# Patient Record
Sex: FEMALE | HISPANIC OR LATINO
[De-identification: names, ages, dates, MRNs, and addresses within clinical notes are randomized per-mention and may not be internally consistent; named-entity substitution may affect disease eponyms.]

---

## 2019-03-15 ENCOUNTER — RECORD ABSTRACTING (OUTPATIENT)
Age: 84
End: 2019-03-15

## 2019-03-15 DIAGNOSIS — Z86.59 PERSONAL HISTORY OF OTHER MENTAL AND BEHAVIORAL DISORDERS: ICD-10-CM

## 2019-03-15 DIAGNOSIS — Z86.39 PERSONAL HISTORY OF OTHER ENDOCRINE, NUTRITIONAL AND METABOLIC DISEASE: ICD-10-CM

## 2019-03-15 DIAGNOSIS — M47.814 SPONDYLOSIS W/OUT MYELOPATHY OR RADICULOPATHY, THORACIC REGION: ICD-10-CM

## 2019-03-15 DIAGNOSIS — M79.7 FIBROMYALGIA: ICD-10-CM

## 2019-03-15 DIAGNOSIS — Z78.9 OTHER SPECIFIED HEALTH STATUS: ICD-10-CM

## 2019-03-15 PROBLEM — Z00.00 ENCOUNTER FOR PREVENTIVE HEALTH EXAMINATION: Status: ACTIVE | Noted: 2019-03-15

## 2019-03-15 RX ORDER — SIMVASTATIN 40 MG/1
40 TABLET, FILM COATED ORAL
Refills: 0 | Status: ACTIVE | COMMUNITY

## 2019-03-18 ENCOUNTER — APPOINTMENT (OUTPATIENT)
Dept: PHYSICAL MEDICINE AND REHAB | Facility: CLINIC | Age: 84
End: 2019-03-18
Payer: MEDICARE

## 2019-03-18 VITALS — WEIGHT: 115 LBS | HEIGHT: 67 IN | BODY MASS INDEX: 18.05 KG/M2

## 2019-03-18 PROCEDURE — 64484 NJX AA&/STRD TFRM EPI L/S EA: CPT | Mod: RT

## 2019-03-18 PROCEDURE — 64483 NJX AA&/STRD TFRM EPI L/S 1: CPT | Mod: RT

## 2019-03-18 RX ORDER — LISINOPRIL AND HYDROCHLOROTHIAZIDE TABLETS 10; 12.5 MG/1; MG/1
10-12.5 TABLET ORAL
Refills: 0 | Status: DISCONTINUED | COMMUNITY
End: 2019-03-18

## 2019-03-18 RX ORDER — NAPROXEN SODIUM 550 MG/1
550 TABLET ORAL
Refills: 0 | Status: DISCONTINUED | COMMUNITY
End: 2019-03-18

## 2019-03-18 RX ORDER — ATORVASTATIN CALCIUM 20 MG/1
20 TABLET, FILM COATED ORAL
Refills: 0 | Status: DISCONTINUED | COMMUNITY
End: 2019-03-18

## 2019-03-18 RX ORDER — DONEPEZIL HYDROCHLORIDE 10 MG/1
10 TABLET ORAL
Refills: 0 | Status: DISCONTINUED | COMMUNITY
End: 2019-03-18

## 2019-03-18 RX ORDER — PRAVASTATIN SODIUM 40 MG/1
40 TABLET ORAL
Refills: 0 | Status: DISCONTINUED | COMMUNITY
End: 2019-03-18

## 2019-06-06 ENCOUNTER — APPOINTMENT (OUTPATIENT)
Dept: PHYSICAL MEDICINE AND REHAB | Facility: CLINIC | Age: 84
End: 2019-06-06
Payer: MEDICARE

## 2019-06-06 DIAGNOSIS — M51.26 OTHER INTERVERTEBRAL DISC DISPLACEMENT, LUMBAR REGION: ICD-10-CM

## 2019-06-06 DIAGNOSIS — M79.18 MYALGIA, OTHER SITE: ICD-10-CM

## 2019-06-06 DIAGNOSIS — M48.062 SPINAL STENOSIS, LUMBAR REGION WITH NEUROGENIC CLAUDICATION: ICD-10-CM

## 2019-06-06 PROCEDURE — 20552 NJX 1/MLT TRIGGER POINT 1/2: CPT

## 2019-06-06 PROCEDURE — 99213 OFFICE O/P EST LOW 20 MIN: CPT | Mod: 25

## 2019-06-06 NOTE — PHYSICAL EXAM
[FreeTextEntry1] : CHASITY is a 86 year female \par Constitutional: healthy appearing, NAD, and normal body habitus\par \par LUMBAR\par ROM: flexion to 30 deg, ext to 5 deg\par \par Gait: antalgic\par \par Inspection: no erythema, warmth\par Spine: no TTP in spinous process, SI joint, sacrum\par Bony palpation: no TTP in GT\par \par Soft tissue palpation hip: no TTP in gluteus yash, medius\par Soft tissue palpation of spine: no TTP in lumbar paraspinals\par \par 5/5 bilateral HF, KE, DF, PF \par sensation intact in bilat LE except decreased in right foot\par reflexes: knee and ankle 0 bilat\par \par Special tests: neg seated SLR\par \par

## 2019-06-06 NOTE — PROCEDURE
[de-identified] : After informed consent,she elected to proceed with a trigger point injection into the right lumbar paraspinals. I confirmed no prior adverse reactions, no active infections, and no relevant allergies. \par  \par The skin was prepped in the usual sterile manner.  The sites were injected with local anesthetic followed by local needling. The injection was completed without complication and a bandage was applied. She tolerated the procedure well and was given post-injection instructions. \par  \par Cold Tx x 48 hours, analgesics prn. Medications: 0.5 ml of 1% Lidocaine per site\par \par

## 2019-06-06 NOTE — HISTORY OF PRESENT ILLNESS
[FreeTextEntry1] : Location: right back\par Quality: sharp \par Severity: pain level 7/10 \par Duration: few months \par Timing: recurrent \par Context: atraumatic \par Aggravating Factors: sitting; bending \par Alleviating Factors: PT/OT; THIAGO \par Associated Symptoms: no weakness; no numbness/tingling; no weight loss; no fever; no chills; no change in bowel/bladder habits; radiation down leg (right) \par Prior Studies: x ray; MRI \par Working: Retired\par \par 4/2013 right L5 THIAGO\par 3/2019 right L4 and L5 THIAGO

## 2019-06-06 NOTE — ASSESSMENT
[FreeTextEntry1] : Will do right facet injections.  Needs to ice area and do HEP.  Daughter present in discussion.

## 2019-06-21 ENCOUNTER — APPOINTMENT (OUTPATIENT)
Dept: PHYSICAL MEDICINE AND REHAB | Facility: CLINIC | Age: 84
End: 2019-06-21
Payer: MEDICARE

## 2019-06-21 DIAGNOSIS — M47.816 SPONDYLOSIS W/OUT MYELOPATHY OR RADICULOPATHY, LUMBAR REGION: ICD-10-CM

## 2019-06-21 PROCEDURE — 64493 INJ PARAVERT F JNT L/S 1 LEV: CPT | Mod: RT

## 2019-06-21 PROCEDURE — 64494 INJ PARAVERT F JNT L/S 2 LEV: CPT | Mod: RT

## 2019-07-12 ENCOUNTER — APPOINTMENT (OUTPATIENT)
Dept: PHYSICAL MEDICINE AND REHAB | Facility: CLINIC | Age: 84
End: 2019-07-12

## 2020-12-04 ENCOUNTER — EMERGENCY (EMERGENCY)
Facility: HOSPITAL | Age: 85
LOS: 1 days | Discharge: HOME | End: 2020-12-04
Attending: EMERGENCY MEDICINE | Admitting: EMERGENCY MEDICINE
Payer: MEDICARE

## 2020-12-04 VITALS
HEART RATE: 56 BPM | SYSTOLIC BLOOD PRESSURE: 138 MMHG | TEMPERATURE: 98 F | OXYGEN SATURATION: 96 % | DIASTOLIC BLOOD PRESSURE: 72 MMHG | RESPIRATION RATE: 16 BRPM

## 2020-12-04 VITALS
HEIGHT: 65 IN | HEART RATE: 62 BPM | RESPIRATION RATE: 18 BRPM | DIASTOLIC BLOOD PRESSURE: 79 MMHG | OXYGEN SATURATION: 98 % | WEIGHT: 130.07 LBS | SYSTOLIC BLOOD PRESSURE: 127 MMHG | TEMPERATURE: 98 F

## 2020-12-04 PROCEDURE — 90715 TDAP VACCINE 7 YRS/> IM: CPT

## 2020-12-04 PROCEDURE — 99284 EMERGENCY DEPT VISIT MOD MDM: CPT | Mod: 25

## 2020-12-04 PROCEDURE — 70450 CT HEAD/BRAIN W/O DYE: CPT

## 2020-12-04 PROCEDURE — 90471 IMMUNIZATION ADMIN: CPT

## 2020-12-04 PROCEDURE — 99284 EMERGENCY DEPT VISIT MOD MDM: CPT

## 2020-12-04 PROCEDURE — 70450 CT HEAD/BRAIN W/O DYE: CPT | Mod: 26

## 2020-12-04 RX ORDER — ACETAMINOPHEN 500 MG
650 TABLET ORAL ONCE
Refills: 0 | Status: COMPLETED | OUTPATIENT
Start: 2020-12-04 | End: 2020-12-04

## 2020-12-04 RX ORDER — OFLOXACIN 0.3 %
1 DROPS OPHTHALMIC (EYE)
Qty: 20 | Refills: 0
Start: 2020-12-04 | End: 2020-12-08

## 2020-12-04 RX ORDER — BACITRACIN ZINC 500 UNIT/G
1 OINTMENT IN PACKET (EA) TOPICAL ONCE
Refills: 0 | Status: COMPLETED | OUTPATIENT
Start: 2020-12-04 | End: 2020-12-04

## 2020-12-04 RX ORDER — TETANUS TOXOID, REDUCED DIPHTHERIA TOXOID AND ACELLULAR PERTUSSIS VACCINE, ADSORBED 5; 2.5; 8; 8; 2.5 [IU]/.5ML; [IU]/.5ML; UG/.5ML; UG/.5ML; UG/.5ML
0.5 SUSPENSION INTRAMUSCULAR ONCE
Refills: 0 | Status: COMPLETED | OUTPATIENT
Start: 2020-12-04 | End: 2020-12-04

## 2020-12-04 RX ADMIN — TETANUS TOXOID, REDUCED DIPHTHERIA TOXOID AND ACELLULAR PERTUSSIS VACCINE, ADSORBED 0.5 MILLILITER(S): 5; 2.5; 8; 8; 2.5 SUSPENSION INTRAMUSCULAR at 17:01

## 2020-12-04 RX ADMIN — Medication 650 MILLIGRAM(S): at 17:31

## 2020-12-04 RX ADMIN — Medication 1 DROP(S): at 17:01

## 2020-12-04 RX ADMIN — Medication 1 APPLICATION(S): at 17:31

## 2020-12-04 RX ADMIN — Medication 650 MILLIGRAM(S): at 17:55

## 2020-12-04 NOTE — ED PROVIDER NOTE - OBJECTIVE STATEMENT
88F with a h/o dementia, lives with  at home, who p/w headache and facial abrasion after she accidentally fell out of bed 2 days ago. They went to urgent care and were told to come to the ER but patient refused until today. Pt also c/o left eye redness and irritation. Per daughter, the patient's  accidentally put toothpaste in her left eye "trying to help." Unknown last tetanus. No n/v, no cp/sob, no other complaints.

## 2020-12-04 NOTE — ED ADULT NURSE REASSESSMENT NOTE - NS ED NURSE REASSESS COMMENT FT1
Patient alert, disoriented to time, baseline dementia, denies any headache or any pain, no dizziness or lightheadedness.  Abrasion noted to left forehead area.  Vital signs stable.  CT scan done.  ADACEL IM administered.  Fall precaution observed; bed on low, siderails X 2, monitored in front of nurses station.  Results and disposition pending.  Stable and comfortable.

## 2020-12-04 NOTE — ED PROVIDER NOTE - CARE PLAN
Principal Discharge DX:	Abrasion  Secondary Diagnosis:	Headache   Principal Discharge DX:	Abrasion  Secondary Diagnosis:	Headache  Secondary Diagnosis:	Corneal abrasion

## 2020-12-04 NOTE — ED ADULT NURSE NOTE - OBJECTIVE STATEMENT
s/p fall 2 days ago in patient's bedroom, hit left side of face/head on nearby cabinet/table, no LOC, not on blood thinners.  Went to  2 days ago and told to go to ED.  Arrives w/ abrasion to left forehead area and redness and tearing to left eye.  Baseline dementia, no other neuro deficits.

## 2020-12-04 NOTE — ED ADULT TRIAGE NOTE - CHIEF COMPLAINT QUOTE
89 y/o female brought in by daughter for evaluation of headache s/p unwitnessed fall yesterday, Pt denies LOC, as per daughter patient with hx of dementia, not in any anticoagulants. abrasion noted to forehead.

## 2020-12-04 NOTE — ED ADULT TRIAGE NOTE - LANGUAGE ASSISTANCE NEEDED
No-Patient/Caregiver offered and refused free interpretation services./family member interpreted for pt

## 2020-12-04 NOTE — ED ADULT NURSE NOTE - LANGUAGE ASSISTANCE NEEDED
family member interpreted for pt/No-Patient/Caregiver offered and refused free interpretation services.

## 2020-12-04 NOTE — ED PROVIDER NOTE - CLINICAL SUMMARY MEDICAL DECISION MAKING FREE TEXT BOX
88F with a h/o dementia, lives with  at home, who p/w headache and facial abrasion after she accidentally fell out of bed 2 days ago. They went to urgent care and were told to come to the ER but patient refused until today. Pt also c/o left eye redness and irritation. 88F with a h/o dementia, lives with  at home, who p/w headache and facial abrasion after she accidentally fell out of bed 2 days ago. They went to urgent care and were told to come to the ER but patient refused until today. Pt also c/o left eye redness and irritation.  CT neg for ICH.   L eye stained with tetracaine and fluorescin with + small corneal abrasion at 5 o'clock, no globe injury.  Stable for DC with rx for ofloxacin (pt does not wear contacts), bacitracin for abrasions, and otc tylenol, motrin.

## 2020-12-04 NOTE — ED ADULT NURSE NOTE - CHPI ED NUR SYMPTOMS NEG
no loss of consciousness/no numbness/no vomiting/no weakness/no tingling/no bleeding/no fever/no deformity

## 2020-12-04 NOTE — ED ADULT NURSE REASSESSMENT NOTE - NS ED NURSE REASSESS COMMENT FT1
Patient baseline mental status, no new symptom complaint, Bacitracin applied to abrasion.  VItal signs stable.  CT scan resulted.  Discharged to home in stable condition.

## 2020-12-04 NOTE — ED PROVIDER NOTE - PATIENT PORTAL LINK FT
You can access the FollowMyHealth Patient Portal offered by [facility] by registering at the following website: [email or web address]. By joining [facility]’s FollowMyHealth portal, you will also be able to view your health information using other applications (apps) compatible with our system.

## 2020-12-04 NOTE — ED ADULT NURSE NOTE - NSIMPLEMENTINTERV_GEN_ALL_ED
Implemented All Fall Risk Interventions:  [place] to call system. Call bell, personal items and telephone within reach. Instruct patient to call for assistance. Room bathroom lighting operational. Non-slip footwear when patient is off stretcher. Physically safe environment: no spills, clutter or unnecessary equipment. Stretcher in lowest position, wheels locked, appropriate side rails in place. Provide visual cue, wrist band, yellow gown, etc. Monitor gait and stability. Monitor for mental status changes and reorient to person, place, and time. Review medications for side effects contributing to fall risk. Reinforce activity limits and safety measures with patient and family.

## 2020-12-04 NOTE — ED PROVIDER NOTE - PHYSICAL EXAMINATION
GEN: Well appearing, well nourished, pleasantly demented and in no apparent distress. NTAF  ENT: Airway patent, Nasal mucosa clear. Mouth with normal mucosa.  EYES: RIght eye clear, left eye with conjunctival injection, +small corneal abrasion at 5 o'clock, no globe injury, neg seidels sign, PERRL, EOMI  RESPIRATORY: Breathing comfortably with normal RR. No W/C/R, no hypoxia or resp distress.  CARDIAC: Regular rate and rhythm, no M/R/G  ABDOMEN: Soft, nontender, +bowel sounds, no rebound, rigidity, or guarding.  MSK: Range of motion is not limited, no deformities noted.  NEURO: Alert and oriented, no focal deficits.  SKIN: Skin normal color for race, warm, dry. Healing abrasions to middle of forehead, no cellulitis, no bleeding or drainage  PSYCH: Alert, demented, no apparent risk to self or others.

## 2020-12-04 NOTE — ED PROVIDER NOTE - NSFOLLOWUPINSTRUCTIONS_ED_ALL_ED_FT
Please follow up with your primary care physician and eye doctor. Use eye drops as directed. Take tylenol or motrin OTC for pain. Do not scratch eye.  If you have any problem getting an appointment this week, please call the ED Referral Coordinator at [phone].  Return to the Emergency Department if you have any new or worsening symptoms, or for any other concerns. Please read below for further information.      Corneal Abrasion     WHAT YOU NEED TO KNOW:    A corneal abrasion is a scratch on the cornea of your eye. The cornea is the clear layer that covers the front of your eye. A small scratch may heal in 1 to 2 days. Deeper or larger scratches may take longer to heal.     Eye Anatomy         DISCHARGE INSTRUCTIONS:    Call your healthcare provider or ophthalmologist if:   •Your eye pain or vision gets worse.      •You have yellow or green drainage from your eye.      •You have questions or concerns about your condition or care.      Medicines:   •Medicines may be given in the form of eyedrops or ointment to help prevent an eye infection. You may also be given eyedrops to decrease pain.      •Take your medicine as directed. Contact your healthcare provider if you think your medicine is not helping or if you have side effects. Tell him or her if you are allergic to any medicine. Keep a list of the medicines, vitamins, and herbs you take. Include the amounts, and when and why you take them. Bring the list or the pill bottles to follow-up visits. Carry your medicine list with you in case of an emergency.      Care for your eyes:   •Get regular eye exams. Get your eyes checked at least every year.      •Eat healthy foods. Fresh fruits and vegetables that are rich in vitamins A and C may help with your vision. Foods such as sweet potatoes, apricots, and carrots are rich in good nutrients for the eyes.      •Take care of your contacts or glasses. Store, clean, and use your contacts or glasses as directed. Replace your glasses or contact lenses as often as your healthcare provider suggests.      •Decrease eye strain. Rest your eyes, especially after you read or sew for long periods of time. Get plenty of sleep at night. Use lights that reduce glare in your home, school, or workplace.      •Wear dark sunglasses. This will help prevent pain and light sensitivity. Make sure the sunglasses have UVA and UVB protection. This will protect your eyes when you go outside.      •Use eyedrops safely. If your treatment plan includes eyedrops, it is important to use them as directed. Your provider may give you detailed instructions to follow. The eyedrops may also come with safety instructions. Follow all instructions to help prevent an infection. Do not touch the tip of the bottle to your eye. Germs from your eye can spread to the medicine bottle.  Steps 1 2 3 4           Help prevent corneal abrasions:   •Remove your contact lenses if your eyes feel dry or irritated.      •Wash your hands if you need to touch your eyes or your face.      •Trim your child's fingernails so he or she cannot scratch his or her eye.      •Wear protective eyewear when you work with chemicals, wood, dust, or metal.      •Wear protective eyewear when you play sports.      •Do not wear your contacts for longer than you should.      •Do not wear colored lenses or lenses with shapes on them. These lenses may cause eye damage and vision loss.      •Do not wear glitter makeup. Glitter can easily get into your eyes and under contact lenses.      •Do not sleep with your contacts in.      Follow up with your healthcare provider as directed: Write down your questions so you remember to ask them during your visits.       Abrasion    WHAT YOU NEED TO KNOW:    An abrasion is a scrape on your skin. It happens when your skin rubs against a rough surface. Some examples of an abrasion include rug burn, a skinned elbow, or road rash. Abrasions can be many shapes and sizes. The wound may hurt, bleed, bruise, or swell.     DISCHARGE INSTRUCTIONS:    Return to the emergency department if:   •The bleeding does not stop after 10 minutes of firm pressure.      •You cannot rinse one or more foreign objects out of your wound.      •You have red streaks on your skin coming from your wound.      Contact your healthcare provider if:   •You have a fever or chills.       •Your abrasion is red, warm, swollen, or draining pus.      •You have questions or concerns about your condition or care.      Care for your abrasion:   •Wash your hands and dry them with a clean towel.      •Press a clean cloth against your wound to stop any bleeding.      •Rinse your wound with a lot of clean water. Do not use harsh soap, alcohol, or iodine solutions.      •Use a clean, wet cloth to remove any objects, such as small pieces of rocks or dirt.      •Rub antibiotic ointment on your wound. This may help prevent infection and help your wound heal.      •Cover the wound with a non-stick bandage. Change the bandage daily, and if gets wet or dirty.       Follow up with your healthcare provider as directed: Write down your questions so you remember to ask them during your visits.

## 2020-12-05 ENCOUNTER — TRANSCRIPTION ENCOUNTER (OUTPATIENT)
Age: 85
End: 2020-12-05

## 2020-12-08 DIAGNOSIS — S05.02XA INJURY OF CONJUNCTIVA AND CORNEAL ABRASION WITHOUT FOREIGN BODY, LEFT EYE, INITIAL ENCOUNTER: ICD-10-CM
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
## 2020-12-08 DIAGNOSIS — R51.9 HEADACHE, UNSPECIFIED: ICD-10-CM

## 2020-12-08 DIAGNOSIS — S00.81XA ABRASION OF OTHER PART OF HEAD, INITIAL ENCOUNTER: ICD-10-CM
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
## 2023-09-01 NOTE — ED ADULT TRIAGE NOTE - CCCP TRG CHIEF CMPLNT
fall Soolantra Pregnancy And Lactation Text: This medication is Pregnancy Category C. This medication is considered safe during breast feeding.

## 2024-01-02 NOTE — ED ADULT NURSE NOTE - NSFALLRSKHARMRISK_ED_ALL_ED
Treatment Plan Tracking    # 1Treatment Plan not completed within required time limits due to:  Pt has no further scheduled appts and will be discharged.  .                     
no